# Patient Record
Sex: MALE | Race: WHITE | NOT HISPANIC OR LATINO | URBAN - METROPOLITAN AREA
[De-identification: names, ages, dates, MRNs, and addresses within clinical notes are randomized per-mention and may not be internally consistent; named-entity substitution may affect disease eponyms.]

---

## 2018-08-15 ENCOUNTER — EMERGENCY (EMERGENCY)
Facility: HOSPITAL | Age: 48
LOS: 1 days | Discharge: HOME | End: 2018-08-17
Attending: EMERGENCY MEDICINE | Admitting: EMERGENCY MEDICINE

## 2018-08-15 VITALS
DIASTOLIC BLOOD PRESSURE: 89 MMHG | HEART RATE: 74 BPM | RESPIRATION RATE: 18 BRPM | SYSTOLIC BLOOD PRESSURE: 161 MMHG | OXYGEN SATURATION: 95 % | TEMPERATURE: 98 F

## 2018-08-15 DIAGNOSIS — R07.9 CHEST PAIN, UNSPECIFIED: ICD-10-CM

## 2018-08-15 LAB
ALBUMIN SERPL ELPH-MCNC: 4.6 G/DL — SIGNIFICANT CHANGE UP (ref 3.5–5.2)
ALP SERPL-CCNC: 72 U/L — SIGNIFICANT CHANGE UP (ref 30–115)
ALT FLD-CCNC: 46 U/L — HIGH (ref 0–41)
ANION GAP SERPL CALC-SCNC: 16 MMOL/L — HIGH (ref 7–14)
AST SERPL-CCNC: 39 U/L — SIGNIFICANT CHANGE UP (ref 0–41)
BASOPHILS # BLD AUTO: 0.03 K/UL — SIGNIFICANT CHANGE UP (ref 0–0.2)
BASOPHILS NFR BLD AUTO: 0.3 % — SIGNIFICANT CHANGE UP (ref 0–1)
BILIRUB SERPL-MCNC: 0.6 MG/DL — SIGNIFICANT CHANGE UP (ref 0.2–1.2)
BUN SERPL-MCNC: 11 MG/DL — SIGNIFICANT CHANGE UP (ref 10–20)
CALCIUM SERPL-MCNC: 9.3 MG/DL — SIGNIFICANT CHANGE UP (ref 8.5–10.1)
CHLORIDE SERPL-SCNC: 100 MMOL/L — SIGNIFICANT CHANGE UP (ref 98–110)
CHOLEST SERPL-MCNC: 230 MG/DL — HIGH (ref 100–200)
CO2 SERPL-SCNC: 24 MMOL/L — SIGNIFICANT CHANGE UP (ref 17–32)
CREAT SERPL-MCNC: 1 MG/DL — SIGNIFICANT CHANGE UP (ref 0.7–1.5)
EOSINOPHIL # BLD AUTO: 0.19 K/UL — SIGNIFICANT CHANGE UP (ref 0–0.7)
EOSINOPHIL NFR BLD AUTO: 1.9 % — SIGNIFICANT CHANGE UP (ref 0–8)
GLUCOSE SERPL-MCNC: 105 MG/DL — HIGH (ref 70–99)
HCT VFR BLD CALC: 46.3 % — SIGNIFICANT CHANGE UP (ref 42–52)
HDLC SERPL-MCNC: 40 MG/DL — SIGNIFICANT CHANGE UP
HGB BLD-MCNC: 15.5 G/DL — SIGNIFICANT CHANGE UP (ref 14–18)
IMM GRANULOCYTES NFR BLD AUTO: 0.4 % — HIGH (ref 0.1–0.3)
LIPID PNL WITH DIRECT LDL SERPL: 160 MG/DL — HIGH (ref 4–129)
LYMPHOCYTES # BLD AUTO: 2.57 K/UL — SIGNIFICANT CHANGE UP (ref 1.2–3.4)
LYMPHOCYTES # BLD AUTO: 25.7 % — SIGNIFICANT CHANGE UP (ref 20.5–51.1)
MCHC RBC-ENTMCNC: 29.2 PG — SIGNIFICANT CHANGE UP (ref 27–31)
MCHC RBC-ENTMCNC: 33.5 G/DL — SIGNIFICANT CHANGE UP (ref 32–37)
MCV RBC AUTO: 87.4 FL — SIGNIFICANT CHANGE UP (ref 80–94)
MONOCYTES # BLD AUTO: 0.71 K/UL — HIGH (ref 0.1–0.6)
MONOCYTES NFR BLD AUTO: 7.1 % — SIGNIFICANT CHANGE UP (ref 1.7–9.3)
NEUTROPHILS # BLD AUTO: 6.47 K/UL — SIGNIFICANT CHANGE UP (ref 1.4–6.5)
NEUTROPHILS NFR BLD AUTO: 64.6 % — SIGNIFICANT CHANGE UP (ref 42.2–75.2)
NRBC # BLD: 0 /100 WBCS — SIGNIFICANT CHANGE UP (ref 0–0)
PLATELET # BLD AUTO: 228 K/UL — SIGNIFICANT CHANGE UP (ref 130–400)
POTASSIUM SERPL-MCNC: 4.4 MMOL/L — SIGNIFICANT CHANGE UP (ref 3.5–5)
POTASSIUM SERPL-SCNC: 4.4 MMOL/L — SIGNIFICANT CHANGE UP (ref 3.5–5)
PROT SERPL-MCNC: 7.6 G/DL — SIGNIFICANT CHANGE UP (ref 6–8)
RBC # BLD: 5.3 M/UL — SIGNIFICANT CHANGE UP (ref 4.7–6.1)
RBC # FLD: 12.7 % — SIGNIFICANT CHANGE UP (ref 11.5–14.5)
SODIUM SERPL-SCNC: 140 MMOL/L — SIGNIFICANT CHANGE UP (ref 135–146)
TOTAL CHOLESTEROL/HDL RATIO MEASUREMENT: 5.8 RATIO — HIGH (ref 4–5.5)
TRIGL SERPL-MCNC: 224 MG/DL — HIGH (ref 10–149)
TROPONIN T SERPL-MCNC: <0.01 NG/ML — SIGNIFICANT CHANGE UP
TROPONIN T SERPL-MCNC: <0.01 NG/ML — SIGNIFICANT CHANGE UP
WBC # BLD: 10.01 K/UL — SIGNIFICANT CHANGE UP (ref 4.8–10.8)
WBC # FLD AUTO: 10.01 K/UL — SIGNIFICANT CHANGE UP (ref 4.8–10.8)

## 2018-08-15 RX ORDER — SODIUM CHLORIDE 9 MG/ML
1000 INJECTION INTRAMUSCULAR; INTRAVENOUS; SUBCUTANEOUS ONCE
Qty: 0 | Refills: 0 | Status: COMPLETED | OUTPATIENT
Start: 2018-08-15 | End: 2018-08-15

## 2018-08-15 RX ADMIN — SODIUM CHLORIDE 1000 MILLILITER(S): 9 INJECTION INTRAMUSCULAR; INTRAVENOUS; SUBCUTANEOUS at 17:51

## 2018-08-15 NOTE — ED PROVIDER NOTE - ATTENDING CONTRIBUTION TO CARE
47 yo male no significant pmxh presents for evaluation of chest pain. Notes pain is intermittent x 1 week.  Feels like tightness. Has not had this in the past. Pt is retired  and recently started a new job that is more physically strenuous and since has been having these symptoms.  No n/v/f/c/diaphoresis. NO leg pain or swelling.  NO h/o cardiac workup in past. Denies smoking. Exam as noted. ekg, o2, monitor, labs, cxr, obs r/o acs.

## 2018-08-15 NOTE — ED ADULT NURSE NOTE - OBJECTIVE STATEMENT
patient c/o intermittent "chest tightness" for about one week. states he went to his pmd for a routine physical and was told his ekg was "inconclusive" and to come to ed. denies any pain or sob.

## 2018-08-15 NOTE — ED ADULT NURSE REASSESSMENT NOTE - NS ED NURSE REASSESS COMMENT FT1
Pt aaox4 denies any chest pain, nsr on the cardiac monitor. V/s within normal limits. Will continue to monitor.

## 2018-08-15 NOTE — ED PROVIDER NOTE - PHYSICAL EXAMINATION
VITAL SIGNS: I have reviewed nursing notes and confirm.  CONSTITUTIONAL: Well-developed; well-nourished; in no acute distress.  SKIN: Skin exam is warm and dry, no acute rash.  HEAD: Normocephalic; atraumatic.  EYES: Conjunctiva and sclera clear.  ENT: No nasal discharge; airway clear.   NECK: Supple; non tender.  CARD: S1, S2 normal; no murmurs, gallops, or rubs. Regular rate and rhythm. No chest wall TTP.   RESP: No wheezes, rales or rhonchi. Speaking in full sentences.   ABD: Normal bowel sounds; soft; non-distended; non-tender; No rebound or guarding.   EXT: Normal ROM. No clubbing, cyanosis or edema.  NEURO: Alert, oriented. Grossly unremarkable. No focal deficits.

## 2018-08-15 NOTE — ED CDU PROVIDER INITIAL DAY NOTE - ATTENDING CONTRIBUTION TO CARE
I personally evaluated the patient. I reviewed the Resident’s or Physician Assistant’s note (as assigned above), and agree with the findings and plan except as documented in my note.  A 49 y/o m w/ with no sig. PMHx presented to the ED c/o intermittent mid-sternal chest pain for ~ 1 week.  No fever, chills, n/v, sob, pleuritic cp, palpitations, diaphoresis, cough, ha/lh/dizziness, numbness/tingling, neck pain/ stiffness, abd pain, diarrhea, constipation, melena/brbpr, urinary symptoms, trauma, weakness, edema, calf pain/swelling/erythema, sick contacts, recent travel or rash.   on exam: wdwn male sitting on stretcher speaking full sentences, no rash, mmm, neck supple. non-tender, radial pulses 2/4 b/l, no jvd, no pain to palpation to chest wall, no pain with movement/ not reproducible, ctabl w/ breath sounds present b/l, no wheezing or crackles, good air exchange, good respiratory effort, no accessory muscle use, no tachypnea, no stridor, bs present throughout all 4 quadrants, abd soft, nd, nt, no rebound tenderness or guarding, no cvat, FROM of ext, no calf pain/swelling/erythema, AAOx3. motor 5/5 and sensation intact throughout upper and lower ext. no focal deficits.  2 sets CE negative, cxr and ekg reviewed, pending nuclear stress test, will continue to monitor and reassess.

## 2018-08-15 NOTE — ED PROVIDER NOTE - OBJECTIVE STATEMENT
49 yo M with no significant PMHx presents to the ED c/o intermittent mid-sternal chest pain x 1 week. Pt states he started a new job which requires more physical actively then he is used to and symptoms started then. Pt denies smoking hx. Pt denies fever, chills, nausea, vomiting, abdominal pain, diarrhea, headache, dizziness, weakness, SOB, back pain, LOC, trauma, urinary symptoms, cough, calf pain/swelling, recent travel, recent surgery.

## 2018-08-15 NOTE — ED ADULT NURSE NOTE - NSIMPLEMENTINTERV_GEN_ALL_ED
Implemented All Universal Safety Interventions:  [place] to call system. Call bell, personal items and telephone within reach. Instruct patient to call for assistance. Room bathroom lighting operational. Non-slip footwear when patient is off stretcher. Physically safe environment: no spills, clutter or unnecessary equipment. Stretcher in lowest position, wheels locked, appropriate side rails in place.

## 2018-08-16 RX ORDER — ASPIRIN/CALCIUM CARB/MAGNESIUM 324 MG
81 TABLET ORAL ONCE
Qty: 0 | Refills: 0 | Status: DISCONTINUED | OUTPATIENT
Start: 2018-08-16 | End: 2018-08-16

## 2018-08-16 RX ORDER — METOPROLOL TARTRATE 50 MG
25 TABLET ORAL ONCE
Qty: 0 | Refills: 0 | Status: COMPLETED | OUTPATIENT
Start: 2018-08-16 | End: 2018-08-16

## 2018-08-16 RX ORDER — ATORVASTATIN CALCIUM 80 MG/1
40 TABLET, FILM COATED ORAL ONCE
Qty: 0 | Refills: 0 | Status: COMPLETED | OUTPATIENT
Start: 2018-08-16 | End: 2018-08-16

## 2018-08-16 RX ORDER — ASPIRIN/CALCIUM CARB/MAGNESIUM 324 MG
325 TABLET ORAL DAILY
Qty: 0 | Refills: 0 | Status: DISCONTINUED | OUTPATIENT
Start: 2018-08-16 | End: 2018-08-16

## 2018-08-16 RX ORDER — ASPIRIN/CALCIUM CARB/MAGNESIUM 324 MG
81 TABLET ORAL DAILY
Qty: 0 | Refills: 0 | Status: DISCONTINUED | OUTPATIENT
Start: 2018-08-17 | End: 2018-08-17

## 2018-08-16 RX ORDER — METOPROLOL TARTRATE 50 MG
25 TABLET ORAL EVERY 12 HOURS
Qty: 0 | Refills: 0 | Status: DISCONTINUED | OUTPATIENT
Start: 2018-08-17 | End: 2018-08-17

## 2018-08-16 RX ADMIN — Medication 325 MILLIGRAM(S): at 12:00

## 2018-08-16 RX ADMIN — ATORVASTATIN CALCIUM 40 MILLIGRAM(S): 80 TABLET, FILM COATED ORAL at 17:32

## 2018-08-16 RX ADMIN — Medication 25 MILLIGRAM(S): at 17:32

## 2018-08-16 NOTE — CONSULT NOTE ADULT - SUBJECTIVE AND OBJECTIVE BOX
The patient is a 48y Male complaining of tightness of chest.    HPI: 49 yo M with no significant PMHx presented to the ED after being referred by his PMD for intermittent chest tightness x 1 week. Chest tightness was substernal, non positional, non radiating and rated 1-2 out of 10.  Pain was intermittent and lasted only 1-2 min. Pt states he started a new job in April that requires strenuous activity and is able to perform without SOB or CP. Patient denies previous history of similar symptoms. Pt denies smoking hx. Pt denies fever, chills, nausea, vomiting, abdominal pain, dizziness, SOB, cough, leg swelling, or palpitations. In the ED, patient was found to have a positive NST and hypercholesteremia, but negative troponins and EKG.     Patient does not follow-up with a cardiologist. Patient denies history of DM and does not take any medications at home.     Life time non smoker, occasional alcohol use, denies drug use    ROS otherwise negative if not noted in HPI.    PAST MEDICAL & SURGICAL HISTORY  Denies and past medical history    ALLERGIES:  No Known Allergies{[id number]}    MEDICATIONS:  Aspirin 325mg    VITAL SIGNS: Last 24 Hours  T(C): 36.4   T(F): 97.6   HR: 78  BP: 128/66   RR: 16  SpO2: 98% on room    PHYSICAL EXAM     GENERAL:  Patient is in no acute distress, sitting comfortably, obese  NEURO: AAx03  HEENT: Normal mucosa  PULMONARY: Good air entry bilaterally, no rales, no wheezes.  CARDIOVASCULAR:  RRR, S1, S2 present, no murmurs or rubs.   GASTROINTESTINAL: Soft, Nontender, Nondistended, bowel sounds present in all four quadrants  MUSCULOSKELETAL:  No peripheral edema  SKIN: No rashes or lesions    LABS:                        15.5  10.01 )-----------( 228      ( 15 Aug 2018 )             46.3    	140  |  100  |  11  	----------------------------<  105   	4.4   |  24  |  1.0    	Ca    7.6       Aug 2018     	TPro  7.6  /  Alb  4.6  /  TBili  <0.6  /  DBili  x   /  AST  39  /  ALT  46  /  AlkPhos  72  08-15    Cholesterol 230, , HDL 40, Direct  (8/15/18)       CARDIAC MARKERS ( 15 Aug 2018 16:54 )  	x     / <0.01 ng/mL / x     / x     / x             	CARDIAC MARKERS ( 15 Aug 2018 21:50 )  	x     / <0.01 ng/mL / x     / x     / x        EKG (8/15/18)  Normal sinus rhythm, Normal ECG    CXR (8/15/18): No acute findings    NST (8/16/18):   1.  Small reversible defect in the anteroseptal apical wall of the left   ventricle consistent with ischemia.  2.  Normal left ventricular wall motion and wall thickening.  3.  Left ventricular ejection fraction calculated as 62% which is within   the range of normal     Assessment and Plan   49 yo M with no significant PMHx presented to the ED after being referred by his PMD for intermittent chest tightness of 1 week.    r/o cardiac ischemia  EKG: Normal sinus rhythm  Troponin negative x 2  Hypercholesteremia (, Cholesterol 230, )   Cardiac CTA tomorrow morning  Start Lipitor 40mg  Aspirin 81mg  Lopressor 20mg q12, to decrease HR for CTA  If Cardiac CTA is positive patient will go to cath. The patient is a 48y Male complaining of tightness of chest.    HPI: 49 yo M with no significant PMHx presented to the ED after being referred by his PMD for intermittent chest tightness x 1 week. Chest tightness was substernal, non positional, non radiating and rated 1-2 out of 10.  Pain was intermittent and lasted only 1-2 min. Patient denies history of similar symptoms. Pt states he started a new job in April that requires strenuous activity and is able to perform them without SOB or CP. Pt denies smoking hx. Pt denies SOB, palpitations, cough, leg swelling, N/V, abdominal pain, or dizziness.  In the ED, patient was found to have a positive NST and hypercholesteremia, but negative troponin and EKG.     Patient does not follow-up with a cardiologist and has no history of cardiac problems. Patient denies history of DM and does not take any medications at home.     Life time non smoker, occasional alcohol use, denies drug use    ROS otherwise negative if not noted in HPI.    PAST MEDICAL & SURGICAL HISTORY  Denies and past medical history    ALLERGIES:  No Known Allergies{[id number]}    MEDICATIONS:  Aspirin 325mg    VITAL SIGNS: Last 24 Hours  T(C): 36.4   T(F): 97.6   HR: 78  BP: 128/66   RR: 16  SpO2: 98% on room    PHYSICAL EXAM     GENERAL:  Patient is in no acute distress, sitting comfortably, obese  NEURO: AAx03  HEENT: Normal mucosa  PULMONARY: Good air entry bilaterally, no rales, no wheezes.  CARDIOVASCULAR:  RRR, S1, S2 present, no murmurs or rubs.   GASTROINTESTINAL: Soft, Nontender, Nondistended, bowel sounds present in all four quadrants  MUSCULOSKELETAL:  No peripheral edema  SKIN: No rashes or lesions    LABS:                        15.5  10.01 )-----------( 228      ( 15 Aug 2018 )             46.3    	140  |  100  |  11  	----------------------------<  105   	4.4   |  24  |  1.0    	Ca    7.6       Aug 2018     	TPro  7.6  /  Alb  4.6  /  TBili  <0.6  /  DBili  x   /  AST  39  /  ALT  46  /  AlkPhos  72  08-15    Cholesterol 230, , HDL 40, Direct  (8/15/18)       CARDIAC MARKERS ( 15 Aug 2018 16:54 )  	x     / <0.01 ng/mL / x     / x     / x             	CARDIAC MARKERS ( 15 Aug 2018 21:50 )  	x     / <0.01 ng/mL / x     / x     / x        EKG (8/15/18)  Normal sinus rhythm, Normal ECG    CXR (8/15/18): No acute findings    NST (8/16/18):   1.  Small reversible defect in the anteroseptal apical wall of the left   ventricle consistent with ischemia.  2.  Normal left ventricular wall motion and wall thickening.  3.  Left ventricular ejection fraction calculated as 62% which is within   the range of normal     Assessment and Plan   49 yo M with no significant PMHx presented to the ED after being referred by his PMD for intermittent chest tightness of 1 week.    r/o cardiac ischemia  EKG: Normal sinus rhythm  Troponin negative x 2  Hypercholesteremia (, Cholesterol 230, )   Cardiac CTA tomorrow morning  Start Lipitor 40mg  Aspirin 81mg  Lopressor 25mg q12, to decrease HR for CTA  If Cardiac CTA is positive patient will go to cath. The patient is a 48y Male complaining of tightness of chest.    HPI: 47 yo M with no significant PMHx presented to the ED after being referred by his PMD for intermittent chest tightness x 1 week. Chest tightness was substernal, non positional, non radiating and rated 1-2 out of 10.  Pain was intermittent and lasted only 1-2 min. Patient denies history of similar symptoms. Pt states he started a new job in April that requires strenuous activity and is able to perform them without SOB or CP. Pt denies smoking hx. Pt denies SOB, palpitations, cough, leg swelling, N/V, abdominal pain, or dizziness.  In the ED, patient was found to have a positive NST and hypercholesteremia, but negative troponin and EKG.     Patient does not follow-up with a cardiologist and has no history of cardiac problems. Patient denies history of DM and does not take any medications at home.     Life time non smoker, occasional alcohol use, denies drug use    ROS otherwise negative if not noted in HPI.    PAST MEDICAL & SURGICAL HISTORY  Denies and past medical history    ALLERGIES:  No Known Allergies{[id number]}    MEDICATIONS:  Aspirin 325mg    VITAL SIGNS: Last 24 Hours  T(C): 36.4   T(F): 97.6   HR: 78  BP: 128/66   RR: 16  SpO2: 98% on room    PHYSICAL EXAM     GENERAL:  Patient is in no acute distress, sitting comfortably, obese  NEURO: AAx03  HEENT: Normal mucosa  PULMONARY: Good air entry bilaterally, no rales, no wheezes.  CARDIOVASCULAR:  RRR, S1, S2 present, no murmurs or rubs.   GASTROINTESTINAL: Soft, Nontender, Nondistended, bowel sounds present in all four quadrants  MUSCULOSKELETAL:  No peripheral edema  SKIN: No rashes or lesions    LABS:                        15.5  10.01 )-----------( 228      ( 15 Aug 2018 )             46.3    	140  |  100  |  11  	----------------------------<  105   	4.4   |  24  |  1.0    	Ca    7.6       Aug 2018     	TPro  7.6  /  Alb  4.6  /  TBili  <0.6  /  DBili  x   /  AST  39  /  ALT  46  /  AlkPhos  72  08-15    Cholesterol 230, , HDL 40, Direct  (8/15/18)       CARDIAC MARKERS ( 15 Aug 2018 16:54 )  	x     / <0.01 ng/mL / x     / x     / x             	CARDIAC MARKERS ( 15 Aug 2018 21:50 )  	x     / <0.01 ng/mL / x     / x     / x        EKG (8/15/18)  Normal sinus rhythm, Normal ECG    CXR (8/15/18): No acute findings    NST (8/16/18):   1.  Small reversible defect in the anteroseptal apical wall of the left   ventricle consistent with ischemia.  2.  Normal left ventricular wall motion and wall thickening.  3.  Left ventricular ejection fraction calculated as 62% which is within   the range of normal     Assessment and Plan   47 yo M with no significant PMHx presented to the ED after being referred by his PMD for intermittent chest tightness of 1 week.    R/o coronary ischemia  NST +, shows small reversible defect in the anterior septal apical wall.   EKG: Normal sinus rhythm  Troponin negative x 2  Hypercholesteremia (, Cholesterol 230, )   Cardiac CTA tomorrow morning  Start Lipitor 40mg  Aspirin 81mg  Lopressor 25mg q12, to decrease HR for CTA  If Cardiac CTA is positive patient will go to cath. The patient is a 48y Male complaining of tightness of chest.    HPI: 47 yo M with no significant PMHx presented to the ED after being referred by his PMD for intermittent chest tightness x 1 week. Chest tightness was substernal, non positional, non radiating and rated 1-2 out of 10.  Pain was intermittent and lasted only 1-2 min. Patient denies history of similar symptoms. Pt states he started a new job in April that requires strenuous activity and is able to perform them without SOB or CP. Pt denies smoking hx. Pt denies SOB, palpitations, cough, leg swelling, N/V, abdominal pain, or dizziness.  In the ED, patient was found to have a positive NST and hypercholesteremia, but negative troponin and EKG.     Patient does not follow-up with a cardiologist and has no history of cardiac problems. Patient denies history of DM and does not take any medications at home.     Life time non smoker, occasional alcohol use, denies drug use    ROS otherwise negative if not noted in HPI.    PAST MEDICAL & SURGICAL HISTORY  Denies past medical history    ALLERGIES:  No Known Allergies{[id number]}    VITAL SIGNS: Last 24 Hours  T(C): 36.4   T(F): 97.6   HR: 78  BP: 128/66   RR: 16  SpO2: 98% on room    PHYSICAL EXAM     GENERAL:  Patient is in no acute distress, sitting comfortably, obese  NEURO: AAx03  HEENT: Normal mucosa  PULMONARY: Good air entry bilaterally, no rales, no wheezes.  CARDIOVASCULAR:  RRR, S1, S2 present, no murmurs or rubs.   GASTROINTESTINAL: Soft, Nontender, Nondistended, bowel sounds present in all four quadrants  MUSCULOSKELETAL:  No peripheral edema  SKIN: No rashes or lesions    LABS:                        15.5  10.01 )-----------( 228      ( 15 Aug 2018 )             46.3    	140  |  100  |  11  	----------------------------<  105   	4.4   |  24  |  1.0    	Ca    7.6       Aug 2018     	TPro  7.6  /  Alb  4.6  /  TBili  <0.6  /  DBili  x   /  AST  39  /  ALT  46  /  AlkPhos  72  08-15    Cholesterol 230, , HDL 40, Direct  (8/15/18)       CARDIAC MARKERS ( 15 Aug 2018 16:54 )  	x     / <0.01 ng/mL / x     / x     / x             	CARDIAC MARKERS ( 15 Aug 2018 21:50 )  	x     / <0.01 ng/mL / x     / x     / x        EKG (8/15/18)  Normal sinus rhythm, Normal ECG    CXR (8/15/18): No acute findings    NST (8/16/18):   1.  Small reversible defect in the anteroseptal apical wall of the left   ventricle consistent with ischemia.  2.  Normal left ventricular wall motion and wall thickening.  3.  Left ventricular ejection fraction calculated as 62% which is within   the range of normal     Assessment and Plan   47 yo M with no significant PMHx presented to the ED after being referred by his PMD for intermittent chest tightness of 1 week.    R/o coronary ischemia  NST +, shows small reversible defect in the anterior septal apical wall.   EKG: Normal sinus rhythm  Troponin negative x 2  Hypercholesteremia (, Cholesterol 230, )   Cardiac CTA tomorrow morning  Start Lipitor 40mg  Aspirin 81mg  Lopressor 25mg q12, to decrease HR for CTA  If Cardiac CTA is positive patient will go to cath. The patient is a 48y Male complaining of tightness of chest.    HPI: 49 yo M with no significant PMHx presented to the ED after being referred by his PMD for intermittent chest tightness x 1 week. Chest tightness was substernal, non positional, non radiating and rated 1-2 out of 10.  Pain was intermittent and lasts only 1-2 min per episode. Patient denies history of similar symptoms or inciting factors. Pt states he started a new job in April that requires strenuous activity and is able to perform them without SOB or CP. Pt denies smoking hx. Pt denies SOB, palpitations, cough, leg swelling, N/V, abdominal pain, or dizziness.  In the ED, patient was found to have a positive NST and hypercholesteremia, but negative troponin and EKG.     Patient does not follow-up with a cardiologist and has no history of cardiac problems. Patient denies history of DM and does not take any medications at home.     Life time non smoker, occasional alcohol use, denies drug use    ROS otherwise negative if not noted in HPI.    PAST MEDICAL & SURGICAL HISTORY  Denies past medical history    ALLERGIES:  No Known Allergies{[id number]}    VITAL SIGNS: Last 24 Hours  T(C): 36.4   T(F): 97.6   HR: 78  BP: 128/66   RR: 16  SpO2: 98% on room    PHYSICAL EXAM     GENERAL:  Patient is in no acute distress, sitting comfortably, obese  NEURO: AAx03  HEENT: Normal mucosa  PULMONARY: Good air entry bilaterally, no rales, no wheezes.  CARDIOVASCULAR:  RRR, S1, S2 present, no murmurs or rubs.   GASTROINTESTINAL: Soft, Nontender, Nondistended, bowel sounds present in all four quadrants  MUSCULOSKELETAL:  No peripheral edema  SKIN: No rashes or lesions    LABS:                        15.5  10.01 )-----------( 228      ( 15 Aug 2018 )             46.3    	140  |  100  |  11  	----------------------------<  105   	4.4   |  24  |  1.0    	Ca    7.6       Aug 2018     	TPro  7.6  /  Alb  4.6  /  TBili  <0.6  /  DBili  x   /  AST  39  /  ALT  46  /  AlkPhos  72  08-15    Cholesterol 230, , HDL 40, Direct  (8/15/18)       CARDIAC MARKERS ( 15 Aug 2018 16:54 )  	x     / <0.01 ng/mL / x     / x     / x             	CARDIAC MARKERS ( 15 Aug 2018 21:50 )  	x     / <0.01 ng/mL / x     / x     / x        EKG (8/15/18)  Normal sinus rhythm, Normal ECG    CXR (8/15/18): No acute findings    NST (8/16/18):   1.  Small reversible defect in the anteroseptal apical wall of the left   ventricle consistent with ischemia.  2.  Normal left ventricular wall motion and wall thickening.  3.  Left ventricular ejection fraction calculated as 62% which is within   the range of normal     Assessment and Plan   49 yo M with no significant PMHx presented to the ED after being referred by his PMD for intermittent chest tightness of 1 week.    R/o coronary ischemia  NST +, shows small reversible defect in the anterior septal apical wall.   EKG: Normal sinus rhythm  Troponin negative x 2  Hypercholesteremia (, Cholesterol 230, )   Cardiac CTA tomorrow morning  Start Lipitor 40mg  Aspirin 81mg  Lopressor 25mg q12, to decrease HR for CTA  If Cardiac CTA is positive patient will go to cath. The patient is a 48y Male complaining of tightness of chest.    HPI: 49 yo M with no significant PMHx presented to the ED after being referred by his PMD for intermittent chest tightness x 1 week. Chest tightness was substernal, non positional, non radiating and rated 1-2 out of 10.  Pain was intermittent and lasts only 1-2 min per episode. Patient denies history of similar symptoms or inciting factors. Pt states he started a new job in April that requires strenuous activity and is able to perform them without SOB or CP. Pt denies smoking hx. Pt denies SOB, palpitations, cough, leg swelling, N/V, abdominal pain, or dizziness.  In the ED, patient was found to have a positive NST and hypercholesteremia, but negative troponin and EKG.     Patient does not follow-up with a cardiologist and has no history of cardiac problems. Patient denies history of DM and does not take any medications at home.     Life time non smoker, occasional alcohol use, denies drug use    ROS otherwise negative if not noted in HPI.    PAST MEDICAL & SURGICAL HISTORY  Denies past medical history    FHX: no family hx of cad    ALLERGIES:  No Known Allergies{[id number]}    VITAL SIGNS: Last 24 Hours  T(C): 36.4   T(F): 97.6   HR: 78  BP: 128/66   RR: 16  SpO2: 98% on room    PHYSICAL EXAM     GENERAL:  Patient is in no acute distress, sitting comfortably, obese  NEURO: AAx03  HEENT: Normal mucosa  PULMONARY: Good air entry bilaterally, no rales, no wheezes.  CARDIOVASCULAR:  RRR, S1, S2 present, no murmurs or rubs.   GASTROINTESTINAL: Soft, Nontender, Nondistended, bowel sounds present in all four quadrants  MUSCULOSKELETAL:  No peripheral edema  SKIN: No rashes or lesions    LABS:                        15.5  10.01 )-----------( 228      ( 15 Aug 2018 )             46.3    	140  |  100  |  11  	----------------------------<  105   	4.4   |  24  |  1.0    	Ca    7.6       Aug 2018     	TPro  7.6  /  Alb  4.6  /  TBili  <0.6  /  DBili  x   /  AST  39  /  ALT  46  /  AlkPhos  72  08-15    Cholesterol 230, , HDL 40, Direct  (8/15/18)       CARDIAC MARKERS ( 15 Aug 2018 16:54 )  	x     / <0.01 ng/mL / x     / x     / x             	CARDIAC MARKERS ( 15 Aug 2018 21:50 )  	x     / <0.01 ng/mL / x     / x     / x        EKG (8/15/18)  Normal sinus rhythm, Normal ECG    CXR (8/15/18): No acute findings    NST (8/16/18):   1.  Small reversible defect in the anteroseptal apical wall of the left   ventricle consistent with ischemia.  2.  Normal left ventricular wall motion and wall thickening.  3.  Left ventricular ejection fraction calculated as 62% which is within   the range of normal     Assessment and Plan   49 yo M with no significant PMHx presented to the ED after being referred by his PMD for intermittent chest tightness of 1 week.    R/o coronary ischemia  NST +, shows small reversible defect in the anterior septal apical wall.   EKG: Normal sinus rhythm  Troponin negative x 2  Hypercholesteremia (, Cholesterol 230, )   Cardiac CTA tomorrow morning  Start Lipitor 40mg  Aspirin 81mg  Lopressor 25mg q12, to decrease HR for CTA  If Cardiac CTA is positive patient will go to cath.

## 2018-08-16 NOTE — ED CDU PROVIDER SUBSEQUENT DAY NOTE - HISTORY
47 yo M with no significant PMHx presents to the ED c/o intermittent mid-sternal chest pain x 1 week. Pt states he started a new job which requires more physical actively then he is used to and symptoms started then. Pt denies smoking hx. Pt denies fever, chills, nausea, vomiting, abdominal pain, diarrhea, headache, dizziness, weakness, SOB, back pain, LOC, trauma, urinary symptoms, cough, calf pain/swelling, recent travel, recent surgery.

## 2018-08-16 NOTE — ED CDU PROVIDER SUBSEQUENT DAY NOTE - ATTENDING CONTRIBUTION TO CARE
I personally evaluated the patient. I reviewed the Resident’s or Physician Assistant’s note (as assigned above), and agree with the findings and plan except as documented in my note.  A 47 y/o m w/ with no sig. PMHx presented to the ED c/o intermittent mid-sternal chest pain for ~ 1 week.  No fever, chills, n/v, sob, pleuritic cp, palpitations, diaphoresis, cough, ha/lh/dizziness, numbness/tingling, neck pain/ stiffness, abd pain, diarrhea, constipation, melena/brbpr, urinary symptoms, trauma, weakness, edema, calf pain/swelling/erythema, sick contacts, recent travel or rash.   on exam: wdwn male sitting on stretcher speaking full sentences, no rash, mmm, neck supple. non-tender, radial pulses 2/4 b/l, no jvd, no pain to palpation to chest wall, no pain with movement/ not reproducible, ctabl w/ breath sounds present b/l, no wheezing or crackles, good air exchange, good respiratory effort, no accessory muscle use, no tachypnea, no stridor, bs present throughout all 4 quadrants, abd soft, nd, nt, no rebound tenderness or guarding, no cvat, FROM of ext, no calf pain/swelling/erythema, AAOx3. motor 5/5 and sensation intact throughout upper and lower ext. no focal deficits.  2 sets CE negative, cxr and ekg reviewed, pending stress test, will continue to monitor and reassess.

## 2018-08-16 NOTE — ED CDU PROVIDER SUBSEQUENT DAY NOTE - PROGRESS NOTE DETAILS
Patient resting comfortably will continue to monitor. Patient resting comfortably with no complaints will continue to monitor. stress test reviewed, cardiology fellow aware will come evaluate. S/p evaluation by Cardiology - Dr Loyola. Recommends patient to go for CCTA in the AM. Pt to be started on ASA 81mg, Lipitor 40mg and Lopressor 25mg q 12.

## 2018-08-16 NOTE — CONSULT NOTE ADULT - SUBJECTIVE AND OBJECTIVE BOX
The patient is a 48y Male complaining of tightness of chest.    HPI: 49 yo M with no significant PMHx presented to the ED after being referred by his PMD for intermittent chest tightness x 1 week. Chest tightness was substernal, non positional, non radiating and rated 1-2 out of 10.  Pain was intermittent and lasts only 1-2 min per episode. Patient denies history of similar symptoms or inciting factors. Pt states he started a new job in April that requires strenuous activity and is able to perform them without SOB or CP. Pt denies smoking hx. Pt denies SOB, palpitations, cough, leg swelling, N/V, abdominal pain, or dizziness.  In the ED, patient was found to have a positive NST and hypercholesteremia, but negative troponin and EKG.     Patient does not follow-up with a cardiologist and has no history of cardiac problems. Patient denies history of DM and does not take any medications at home.     Life time non smoker, occasional alcohol use, denies drug use    ROS otherwise negative if not noted in HPI.    PAST MEDICAL & SURGICAL HISTORY  Denies past medical history    FHX: no family hx of cad    ALLERGIES:  No Known Allergies{[id number]}    VITAL SIGNS: Last 24 Hours  T(C): 36.4   T(F): 97.6   HR: 78  BP: 128/66   RR: 16  SpO2: 98% on room    PHYSICAL EXAM     GENERAL:  Patient is in no acute distress, sitting comfortably, obese  NEURO: AAx03  HEENT: Normal mucosa  PULMONARY: Good air entry bilaterally, no rales, no wheezes.  CARDIOVASCULAR:  RRR, S1, S2 present, no murmurs or rubs.   GASTROINTESTINAL: Soft, Nontender, Nondistended, bowel sounds present in all four quadrants  MUSCULOSKELETAL:  No peripheral edema  SKIN: No rashes or lesions    LABS:                        15.5  10.01 )-----------( 228      ( 15 Aug 2018 )             46.3    	140  |  100  |  11  	----------------------------<  105   	4.4   |  24  |  1.0    	Ca    7.6       Aug 2018     	TPro  7.6  /  Alb  4.6  /  TBili  <0.6  /  DBili  x   /  AST  39  /  ALT  46  /  AlkPhos  72  08-15    Cholesterol 230, , HDL 40, Direct  (8/15/18)       CARDIAC MARKERS ( 15 Aug 2018 16:54 )  	x     / <0.01 ng/mL / x     / x     / x             	CARDIAC MARKERS ( 15 Aug 2018 21:50 )  	x     / <0.01 ng/mL / x     / x     / x        EKG (8/15/18)  Normal sinus rhythm, Normal ECG    CXR (8/15/18): No acute findings    NST (8/16/18):   1.  Small reversible defect in the anteroseptal apical wall of the left   ventricle consistent with ischemia.  2.  Normal left ventricular wall motion and wall thickening.  3.  Left ventricular ejection fraction calculated as 62% which is within   the range of normal     Assessment and Plan   49 yo M with no significant PMHx presented to the ED after being referred by his PMD for intermittent chest tightness of 1 week.    R/o coronary ischemia  NST +, shows small reversible defect in the anterior septal apical wall.   EKG: Normal sinus rhythm  Troponin negative x 2  Hypercholesteremia (, Cholesterol 230, )   Cardiac CTA tomorrow morning  Start Lipitor 40mg  Aspirin 81mg  Lopressor 25mg q12, to decrease HR for CTA  If Cardiac CTA is positive patient will go to cath.

## 2018-08-17 VITALS
TEMPERATURE: 98 F | RESPIRATION RATE: 18 BRPM | SYSTOLIC BLOOD PRESSURE: 114 MMHG | HEART RATE: 67 BPM | DIASTOLIC BLOOD PRESSURE: 64 MMHG | OXYGEN SATURATION: 98 %

## 2018-08-17 RX ADMIN — Medication 25 MILLIGRAM(S): at 06:13

## 2018-08-17 NOTE — ED ADULT NURSE REASSESSMENT NOTE - NS ED NURSE REASSESS COMMENT FT1
Pt received from night nurse.  Pt retin comfortably in bed. Pt on cardiac monitor. Pt awaiting CCTA. Pt aware of plan. WIll continue to monitor.

## 2018-08-17 NOTE — ED CDU PROVIDER DISPOSITION NOTE - CLINICAL COURSE
Patient was sent to [facility] for further evaluation of atypical chest pains, has remained in no distress and with stable vitals, ekgs times two no evidence of ischemic changes, enzymes times two normal, CCTA read as normal coronaries, Spoke to Cinthia Loyola (cardiology)  will see patient in office , Copies of all blood work and other studies were given to patient and family , discharged from [facility] without any complainst

## 2018-12-24 NOTE — ED PROVIDER NOTE - NEURO NEGATIVE STATEMENT, MLM
My chart message sent
no loss of consciousness, no gait abnormality, no headache, no sensory deficits, and no weakness.